# Patient Record
Sex: FEMALE
[De-identification: names, ages, dates, MRNs, and addresses within clinical notes are randomized per-mention and may not be internally consistent; named-entity substitution may affect disease eponyms.]

---

## 2023-01-10 PROBLEM — Z00.00 ENCOUNTER FOR PREVENTIVE HEALTH EXAMINATION: Status: ACTIVE | Noted: 2023-01-10

## 2023-02-15 NOTE — HISTORY OF PRESENT ILLNESS
[FreeTextEntry1] : Patient is a 28yo F who presents today for initial evaluation of R breast mas??? She was referred by..... She has hx of..... Denies family history of breast or ovarian cancer. Patient denies palpable masses, skin changes, or nipple discharge bilaterally.\par \par SOURAV Lifetime Risk- ???\par \par 6/3/22: R US ([facility])- R 2.8cm round hypoechoic mass with abrupt margins 6:00 4FN (palpable). Rec US bx. BI-RADS 4\par 6/14/22: R US bx 6:00 (ribbon clip)- FA w/ PASH. Benign and concordant- rec 6m f/u US\par 12/16/22: R US ([facility])- R 3.8cm nodule with well-defined margins 6:00 4FN (increased in size, rec 6m f/u US). BI-RADS 2

## 2023-02-15 NOTE — PHYSICAL EXAM
[Normocephalic] : normocephalic [EOMI] : extra ocular movement intact [Supple] : supple [No Supraclavicular Adenopathy] : no supraclavicular adenopathy [No Cervical Adenopathy] : no cervical adenopathy [de-identified] : Bilateral breast/axilla/supraclavicular area: No masses, discharge, or adenopathy

## 2023-02-17 ENCOUNTER — NON-APPOINTMENT (OUTPATIENT)
Age: 28
End: 2023-02-17

## 2023-02-27 ENCOUNTER — NON-APPOINTMENT (OUTPATIENT)
Age: 28
End: 2023-02-27

## 2023-02-27 ENCOUNTER — APPOINTMENT (OUTPATIENT)
Dept: BREAST CENTER | Facility: CLINIC | Age: 28
End: 2023-02-27